# Patient Record
Sex: MALE | Race: WHITE | NOT HISPANIC OR LATINO | Employment: FULL TIME | URBAN - METROPOLITAN AREA
[De-identification: names, ages, dates, MRNs, and addresses within clinical notes are randomized per-mention and may not be internally consistent; named-entity substitution may affect disease eponyms.]

---

## 2020-05-31 ENCOUNTER — OFFICE VISIT (OUTPATIENT)
Dept: URGENT CARE | Facility: PHYSICIAN GROUP | Age: 35
End: 2020-05-31
Payer: COMMERCIAL

## 2020-05-31 VITALS
OXYGEN SATURATION: 96 % | HEART RATE: 66 BPM | TEMPERATURE: 97.8 F | HEIGHT: 69 IN | BODY MASS INDEX: 23.7 KG/M2 | WEIGHT: 160 LBS | SYSTOLIC BLOOD PRESSURE: 124 MMHG | RESPIRATION RATE: 16 BRPM | DIASTOLIC BLOOD PRESSURE: 72 MMHG

## 2020-05-31 DIAGNOSIS — L23.7 CONTACT DERMATITIS DUE TO POISON OAK: ICD-10-CM

## 2020-05-31 PROCEDURE — 99204 OFFICE O/P NEW MOD 45 MIN: CPT | Performed by: PHYSICIAN ASSISTANT

## 2020-05-31 RX ORDER — METHYLPREDNISOLONE 4 MG/1
TABLET ORAL
Qty: 21 TAB | Refills: 0 | Status: SHIPPED | OUTPATIENT
Start: 2020-05-31

## 2020-05-31 RX ORDER — TRIAMCINOLONE ACETONIDE 1 MG/G
CREAM TOPICAL
Qty: 1 TUBE | Refills: 0 | Status: SHIPPED | OUTPATIENT
Start: 2020-05-31

## 2020-05-31 ASSESSMENT — ENCOUNTER SYMPTOMS
DIARRHEA: 0
COUGH: 0
MYALGIAS: 0
HEADACHES: 0
SORE THROAT: 0
EYE DISCHARGE: 0
SHORTNESS OF BREATH: 0
FEVER: 0
VOMITING: 0
EYE REDNESS: 0

## 2020-05-31 NOTE — PROGRESS NOTES
Subjective:      Guevara Taylor is a 34 y.o. male who presents with Rash (got into some poisen oak, on face, happened a few days ago )        Rash   This is a new problem. Episode onset: x 2-3 days. The affected locations include the face, neck, back and right arm. The rash is characterized by itchiness. He was exposed to plant contact. Pertinent negatives include no congestion, cough, diarrhea, facial edema, fever, shortness of breath, sore throat or vomiting. Treatments tried: Zanfel. The treatment provided mild relief.     The patient presents to clinic secondary to possible exposure to poison oak x2-3 days ago.  The patient states he was [place] in [place] when he may have been exposed to poison oak.  The states he is currently experiencing itchiness to his face and back.  He also reports associated lesions to his right forearm and neck.  The patient states yesterday he had intermittent swelling below his eyes yesterday.  He states this is now improved.  He reports no swelling of his lips, tongue, or throat.  No difficulty breathing.  No difficulty swallowing.  No vomiting.  No diarrhea.  No fever.  The patient states he has taken Zanfel for his current symptoms.     PMH:  has no past medical history on file.  MEDS: No current outpatient medications on file.  ALLERGIES: No Known Allergies  SURGHX: No past surgical history on file.  SOCHX:  reports that he has never smoked. He has never used smokeless tobacco. He reports current alcohol use.  FH: Family history was reviewed, no pertinent findings to report    Review of Systems   Constitutional: Negative for fever.   HENT: Negative for congestion, ear pain and sore throat.    Eyes: Negative for discharge and redness.   Respiratory: Negative for cough and shortness of breath.    Cardiovascular: Negative for chest pain and leg swelling.   Gastrointestinal: Negative for diarrhea and vomiting.   Musculoskeletal: Negative for myalgias.   Skin: Positive for  "rash.   Neurological: Negative for headaches.   All other systems reviewed and are negative.         Objective:     /72 (BP Location: Right arm, Patient Position: Sitting, BP Cuff Size: Adult)   Pulse 66   Temp 36.6 °C (97.8 °F) (Tympanic)   Resp 16   Ht 1.753 m (5' 9\")   Wt 72.6 kg (160 lb)   SpO2 96%   BMI 23.63 kg/m²      Physical Exam  Constitutional:       General: He is not in acute distress.     Appearance: Normal appearance. He is not ill-appearing.   HENT:      Head: Normocephalic and atraumatic.      Comments:   Face:  Slight erythema to the patient's bilateral cheeks with localized swelling to the inferior periorbital region. No tenderness to palpation. No increased warmth.   Scattered pinpoint vesicular lesions to the patient's bilateral cheeks. No discharge/weeping. No pustules.   No swelling to the patient's lips.      Right Ear: External ear normal.      Left Ear: External ear normal.      Nose: Nose normal.      Mouth/Throat:      Mouth: Mucous membranes are moist.      Pharynx: Oropharynx is clear. Uvula midline. No posterior oropharyngeal erythema.      Tonsils: No tonsillar exudate.      Comments:   No swelling of the tongue/throat.  Eyes:      Extraocular Movements: Extraocular movements intact.      Conjunctiva/sclera: Conjunctivae normal.   Neck:      Musculoskeletal: Normal range of motion and neck supple.   Cardiovascular:      Rate and Rhythm: Normal rate and regular rhythm.      Heart sounds: Normal heart sounds.   Pulmonary:      Effort: Pulmonary effort is normal. No respiratory distress.      Breath sounds: Normal breath sounds. No wheezing.   Musculoskeletal: Normal range of motion.   Skin:     General: Skin is warm and dry.      Findings: Rash present.             Comments:   Scattered vesicular-like lesions to the patient's back and right forearm.  No surrounding erythema.  No increased warmth.  No tenderness to palpation.  No swelling.  No discharge/weeping.  No " pustules.   Neurological:      Mental Status: He is alert and oriented to person, place, and time.                 Assessment/Plan:     1. Contact dermatitis due to poison oak  - triamcinolone acetonide (KENALOG) 0.1 % Cream; Apply a small amount to the affected areas twice daily x 7 days. Do not use the cream for more than 7 day. Do not apply to face.  Dispense: 1 Tube; Refill: 0  - methylPREDNISolone (MEDROL DOSEPAK) 4 MG Tablet Therapy Pack; Follow schedule on package instructions.  Dispense: 21 Tab; Refill: 0    The patient's presenting symptoms and physical exam findings are consistent with contact dermatitis due to to poison oak exposure.  Will prescribe the patient a Medrol Dosepak for his current symptoms.  Will also prescribe the patient a topical steroid cream.  Advised the patient not to apply the steroid cream to his face, as it may cause atrophy of the skin.  The patient verbalized understanding.  Recommend OTC medications and supportive care for symptomatic management.  Recommend patient follow-up with his PCP.  Discussed return precautions with the patient, and he verbalized understanding.    Differential diagnoses, supportive care, and indications for immediate follow-up discussed with patient.   Instructed to return to clinic or nearest emergency department for any change in condition, further concerns, or worsening of symptoms.    OTC antihistamine for symptomatic relief   Apply ice to the affected areas  Monitor for signs of infection  Follow-up with PCP  Return to clinic or go to the ED if symptoms worsen or fail to improve, or if patient should develop worsening/increasing/persistent rash, worsening/increasing itchiness, pain/tenderness to the affected area, swelling, increased redness or warmth, discharge/drainage, fever/chills, and/or any concerning symptoms.    Discussed plan with the patient, and he agrees to the above.    Please note that this dictation was created using voice recognition  software. I have made every reasonable attempt to correct obvious errors, but I expect that there may be errors of grammar and possibly content that I did not discover before finalizing the note.